# Patient Record
Sex: FEMALE | Race: WHITE
[De-identification: names, ages, dates, MRNs, and addresses within clinical notes are randomized per-mention and may not be internally consistent; named-entity substitution may affect disease eponyms.]

---

## 2019-10-15 ENCOUNTER — HOSPITAL ENCOUNTER (INPATIENT)
Dept: HOSPITAL 96 - M.ERS | Age: 79
LOS: 1 days | Discharge: HOME | DRG: 70 | End: 2019-10-16
Attending: INTERNAL MEDICINE | Admitting: INTERNAL MEDICINE
Payer: MEDICARE

## 2019-10-15 VITALS — SYSTOLIC BLOOD PRESSURE: 122 MMHG | DIASTOLIC BLOOD PRESSURE: 49 MMHG

## 2019-10-15 VITALS — HEIGHT: 60 IN | WEIGHT: 147.4 LBS | BODY MASS INDEX: 28.94 KG/M2

## 2019-10-15 VITALS — DIASTOLIC BLOOD PRESSURE: 45 MMHG | SYSTOLIC BLOOD PRESSURE: 112 MMHG

## 2019-10-15 VITALS — SYSTOLIC BLOOD PRESSURE: 128 MMHG | DIASTOLIC BLOOD PRESSURE: 53 MMHG

## 2019-10-15 VITALS — SYSTOLIC BLOOD PRESSURE: 129 MMHG | DIASTOLIC BLOOD PRESSURE: 56 MMHG

## 2019-10-15 DIAGNOSIS — Z85.42: ICD-10-CM

## 2019-10-15 DIAGNOSIS — Z82.49: ICD-10-CM

## 2019-10-15 DIAGNOSIS — Z92.21: ICD-10-CM

## 2019-10-15 DIAGNOSIS — I10: ICD-10-CM

## 2019-10-15 DIAGNOSIS — Z85.43: ICD-10-CM

## 2019-10-15 DIAGNOSIS — E78.5: ICD-10-CM

## 2019-10-15 DIAGNOSIS — E43: ICD-10-CM

## 2019-10-15 DIAGNOSIS — D64.9: ICD-10-CM

## 2019-10-15 DIAGNOSIS — E86.0: ICD-10-CM

## 2019-10-15 DIAGNOSIS — Z79.899: ICD-10-CM

## 2019-10-15 DIAGNOSIS — G93.41: Primary | ICD-10-CM

## 2019-10-15 DIAGNOSIS — E03.9: ICD-10-CM

## 2019-10-15 DIAGNOSIS — F03.90: ICD-10-CM

## 2019-10-15 DIAGNOSIS — Z91.018: ICD-10-CM

## 2019-10-15 LAB
ALBUMIN SERPL-MCNC: 3.1 G/DL (ref 3.4–5)
ALP SERPL-CCNC: 62 U/L (ref 46–116)
ALT SERPL-CCNC: 20 U/L (ref 30–65)
ANION GAP SERPL CALC-SCNC: 9 MMOL/L (ref 7–16)
APTT BLD: 24.7 SECONDS (ref 25–31.3)
AST SERPL-CCNC: 22 U/L (ref 15–37)
BILIRUB SERPL-MCNC: 0.6 MG/DL
BUN SERPL-MCNC: 16 MG/DL (ref 7–18)
CALCIUM SERPL-MCNC: 9.4 MG/DL (ref 8.5–10.1)
CHLORIDE SERPL-SCNC: 107 MMOL/L (ref 98–107)
CK-MB MASS: 1.6 NG/ML
CO2 SERPL-SCNC: 25 MMOL/L (ref 21–32)
CREAT SERPL-MCNC: 0.7 MG/DL (ref 0.6–1.3)
GLUCOSE SERPL-MCNC: 102 MG/DL (ref 70–99)
GRANULOCYTES NFR BLD MANUAL: 89 %
HCT VFR BLD CALC: 22.8 % (ref 37–47)
HGB BLD-MCNC: 7.9 GM/DL (ref 12–15)
INR PPP: 1.1
LYMPHOCYTES # BLD: 0.3 THOU/UL (ref 0.8–5.3)
LYMPHOCYTES NFR BLD AUTO: 8 %
MCH RBC QN AUTO: 35.8 PG (ref 26–34)
MCHC RBC AUTO-ENTMCNC: 34.7 G/DL (ref 28–37)
MCV RBC: 103.1 FL (ref 80–100)
METAMYELOCYTES NFR BLD: 2 %
MPV: 7.6 FL. (ref 7.2–11.1)
MYELOCYTES NFR BLD: 1 %
NEUTROPHILS # BLD: 3.8 THOU/UL (ref 1.6–8.1)
NT-PRO BRAIN NAT PEPTIDE: 2210 PG/ML (ref ?–300)
NUCLEATED RBCS: 0 /100WBC
OVALOCYTES BLD QL SMEAR: (no result)
PLATELET # BLD EST: ADEQUATE 10*3/UL
PLATELET COUNT*: 163 THOU/UL (ref 150–400)
POTASSIUM SERPL-SCNC: 3.2 MMOL/L (ref 3.5–5.1)
PROT SERPL-MCNC: 5.7 G/DL (ref 6.4–8.2)
PROTHROMBIN TIME: 11.4 SECONDS (ref 9.2–11.5)
RBC # BLD AUTO: 2.21 MIL/UL (ref 4.2–5)
RDW-CV: 20.6 % (ref 10.5–14.5)
SCHISTOCYTES BLD QL SMEAR: (no result)
SODIUM SERPL-SCNC: 141 MMOL/L (ref 136–145)
TEARDROPS: (no result)
WBC # BLD AUTO: 4.1 THOU/UL (ref 4–11)

## 2019-10-15 NOTE — NUR
PT ADMITTED TO TELE FLOOR . ON RA. VSS. ORTHOSTATIC BP TAKEN SEE CHART. NNO
COMPLAINT. R CHEST PORT PATENT. NS INFUSING AT 80 PER HOUR. NEURO AND HEMO/ONC
CONSULTED. DENIES PAIN, N/V. IV FLUID STARTED AT 80 PER HOUR. ADMISSION
ASSESSMENT AND HX DONE. FALL PRECAUTION IN PLACE. CALL LIGHT AT REACH. WILL
CONTINUE TO MONITOR

## 2019-10-15 NOTE — NUR
PT COUSIN CALLED ABOUT DECADRON AND NEUPOGEN THAT NEED TO BE RESTARTED. THIS
WAS DISCUSSED WITH THE HEM/ONC DOCTOR. DOCTOR ORDER TO RESTART DECADRON
STARTING TOMORROW SINCE PT HAS ALREADY RECEIVED A DOSE IN THE ER. DOCTOR
MENTIONED HE WILL FOLLOW UP ABOUT THE NEUPOGEN TOMORROW.

## 2019-10-15 NOTE — EKG
[place] [address]
Phone:  [phone]                     ELECTROCARDIOGRAM REPORT      
_______________________________________________________________________________
 
Name:       YUDELKAJUNE                 Room:           [place] [address]   ADM [place]  
.R.#:  [id number]      Account #:      [id number]  
Admission:  10/15/19     Attend Phys:    Jonathan Gilbert MD 
Discharge:               Date of Birth:  [birth year]  
         Report #: [id number]
    [id number]
_______________________________________________________________________________
THIS REPORT FOR:  //name//                      
 
                         [facility] ED
                                       
Test Date:    2019-10-15               Test Time:    12:59:53
Pat Name:     JUNE JHA             Department:   
Patient ID:   [id number]            Room:         [facility]
Gender:       F                        Technician:   
:          1940               Requested By: Rasheed Arauz
Order Number: [id number] MD:   Stephan Mansfield
                                 Measurements
Intervals                              Axis          
Rate:         64                       P:            
SC:                                    QRS:          -1
QRSD:         113                      T:            41
QT:           411                                    
QTc:          424                                    
                           Interpretive Statements
sinus rhythm
poor r wave progression
Borderline intraventricular conduction delay
Low voltage, precordial leads
Baseline wander in lead(s) III,aVL,aVF
Compared to ECG 2017 12:12:19
rate has increased
Electronically Signed On 10- 16:11:51 CDT by Stephan Mansfield
[email or web address]
 
 
 
 
 
 
 
 
 
 
 
 
 
 
 
 
  <ELECTRONICALLY SIGNED>
                                           By: Stephan Mansfield MD, FAC      
  10/15/19     1611
D: 10/15/19 1259   _____________________________________
T: 10/15/19 1259   Stephan Mansfield MD, [facility]        /EPI

## 2019-10-16 VITALS — DIASTOLIC BLOOD PRESSURE: 53 MMHG | SYSTOLIC BLOOD PRESSURE: 122 MMHG

## 2019-10-16 VITALS — SYSTOLIC BLOOD PRESSURE: 104 MMHG | DIASTOLIC BLOOD PRESSURE: 42 MMHG

## 2019-10-16 VITALS — SYSTOLIC BLOOD PRESSURE: 109 MMHG | DIASTOLIC BLOOD PRESSURE: 47 MMHG

## 2019-10-16 VITALS — SYSTOLIC BLOOD PRESSURE: 106 MMHG | DIASTOLIC BLOOD PRESSURE: 49 MMHG

## 2019-10-16 LAB
ABSOLUTE BASOPHILS: 0 THOU/UL (ref 0–0.2)
ABSOLUTE EOSINOPHILS: 0 THOU/UL (ref 0–0.7)
ABSOLUTE MONOCYTES: 0.1 THOU/UL (ref 0–1.2)
ANION GAP SERPL CALC-SCNC: 11 MMOL/L (ref 7–16)
BASOPHILS NFR BLD AUTO: 0.2 %
BILIRUB UR-MCNC: NEGATIVE MG/DL
BUN SERPL-MCNC: 18 MG/DL (ref 7–18)
CALCIUM SERPL-MCNC: 8.2 MG/DL (ref 8.5–10.1)
CHLORIDE SERPL-SCNC: 103 MMOL/L (ref 98–107)
CO2 SERPL-SCNC: 27 MMOL/L (ref 21–32)
COLOR UR: (no result)
CREAT SERPL-MCNC: 0.6 MG/DL (ref 0.6–1.3)
EOSINOPHIL NFR BLD: 0 %
GLUCOSE SERPL-MCNC: 106 MG/DL (ref 70–99)
GRANULOCYTES NFR BLD MANUAL: 91.8 %
HCT VFR BLD CALC: 24 % (ref 37–47)
HGB BLD-MCNC: 8.3 GM/DL (ref 12–15)
KETONES UR STRIP-MCNC: NEGATIVE MG/DL
LYMPHOCYTES # BLD: 0.3 THOU/UL (ref 0.8–5.3)
LYMPHOCYTES NFR BLD AUTO: 6.5 %
MCH RBC QN AUTO: 36.2 PG (ref 26–34)
MCHC RBC AUTO-ENTMCNC: 34.7 G/DL (ref 28–37)
MCV RBC: 104.4 FL (ref 80–100)
MONOCYTES NFR BLD: 1.5 %
MPV: 7.7 FL. (ref 7.2–11.1)
NEUTROPHILS # BLD: 4.1 THOU/UL (ref 1.6–8.1)
NUCLEATED RBCS: 0 /100WBC
PLATELET COUNT*: 168 THOU/UL (ref 150–400)
POTASSIUM SERPL-SCNC: 2.9 MMOL/L (ref 3.5–5.1)
PROT UR QL STRIP: NEGATIVE
RBC # BLD AUTO: 2.3 MIL/UL (ref 4.2–5)
RBC # UR STRIP: NEGATIVE /UL
RDW-CV: 20.3 % (ref 10.5–14.5)
SODIUM SERPL-SCNC: 141 MMOL/L (ref 136–145)
SP GR UR STRIP: 1.01 (ref 1–1.03)
URINE CLARITY: CLEAR
URINE GLUCOSE-RANDOM: NEGATIVE
URINE LEUKOCYTES-REFLEX: NEGATIVE
URINE NITRITE-REFLEX: NEGATIVE
UROBILINOGEN UR STRIP-ACNC: 0.2 E.U./DL (ref 0.2–1)
WBC # BLD AUTO: 4.5 THOU/UL (ref 4–11)

## 2019-10-16 NOTE — NUR
ASSUMED CARE OF PT AT 0730. PT RESTING IN BED WAITING FOR BREAKFAST. PT A&0X4,
FORGETFUL AT TIMES. TRACING SB ON THE CARDIAC MONITOR. ON RA SAT 99%. PT
DENIES ANY PAIN OR SHORTNESS OF BREATH AT THIS TIME. PT INCONT OF URINE. IVF.
PT UP WITH SBA TO BATHROOM. PT GOAL FOR TODAY IS COMPLETE MRI/MRA, REPLACE
POTASSIUM PER ELECTROLYTE PROTOCOL, OBTAIN URINALYSIS AND REMAIN FREE FROM
SYNCOPAL EPISODES. AM ASSESSMENT AS CHARTED. MEDICATIONS PER MAR. PT
REPOSITIONS SELF WITH REMINDERS. HOURLY ROUNDING OBSERVED. BED IN LOW
POSITION. BED ALARM IN PLACE. FALL PRECAUTIONS IN PLACE. CALL LIGHT WITHIN
REACH. WILL CONTINUE PLAN OF CARE.

## 2019-10-16 NOTE — NUR
ASSUMED CARE OF PT AFTER REPORT AT 1930. PT A&OX4. VSS. PHYSICAL ASSESSMENT
COMPLETED AND CHARTED. PT ON RA. PT TRACING SR/SB ON TELE. PT UPSTANDBY TO
RESTROOM. PT WITH EPISODES OF INCONTINENT BLADDER. PT DENIES ANY PAIN OR
DISCOMFORT. PT ABLE TO SLEEP WELL ON BED. CALL LIGHT WITHIN REACH.

## 2019-10-16 NOTE — NUR
Pt is A&O, some confusion (?). Pt resides at home alone, supportive cousin
that is involved in POC. Pt has a walker and wc that she can use for mobility.
Pt states that she thinks that she will dc to her cousin's home for a while.
DC orders written, pending neuro. Pt declined HH.

## 2019-10-16 NOTE — NUR
PT HAD MRI AND MRA AND EEG TODAY. REFER TO RESULTS. OK FOR DISCHARGE
PER NEURO. DISCHARGE ORDERS RECEIVED. DISCHARGE INSTRUCTIONS, CARE NOTES AND
FOLLOW UP APPTS GIVEN TO PT. PT COMMUNICATES UNDERSTANDING OF DISCHARGE
TEACHING.  CARDIAC MONITOR REMOVED. HERSON CATH DEACCESSED WITH HEPARIN WITH NO
DIFFICULTIES. PT DISCHARGED WITH ALL BELONGINGS AND PAPERWORK VIA WHEELCHAIR
WITH NURSING STAFF TO FAMILY OWN PERSONAL VEHICLE.

## 2019-10-17 NOTE — CON
[address]  [address]                    CONSULTATION                  
_______________________________________________________________________________
 
Name:       JUNE JHA                 Room:           [address] [place]  
M.R.#:  [id number]      Account #:      [id number]  
Admission:  10/15/19     Attend Phys:    Jonathan Gilbert MD 
Discharge:  10/16/19     Date of Birth:  [birth year]  
         Report #: [id number]
                                                                     [id number]  
_______________________________________________________________________________
THIS REPORT FOR:  //name//                      
 
CC: Jonathan Groves [facility]
 
DATE OF SERVICE:  10/15/2019
 
 
PRIMARY GYNECOLOGIC-ONCOLOGIC DOCTOR:  EBONIE Cordero DO
 
SUBJECTIVE:  A 79-year-old  female, who has been receiving palliative
chemotherapy with carbo, Taxol for recurrent uterine cancer.  She received cycle
#4 on 04/14/2019.  The patient was admitted because of a presyncope and fall at
home.  She continues to live independently at the [facility].  She does not
remember exactly any falls or seizure-like activity, no further information or
details has been found in her records.  Otherwise, the patient was initiated on
hydration.  At this point, she denies any major symptoms.  She had some fatigue.
 The patient denies any nausea, vomiting or diarrhea.  However, she reported
mild neuropathy at hands and the feet.
 
REVIEW OF SYSTEMS:  All systems reviewed.  It was negative except the above.
 
PAST MEDICAL HISTORY:  Recurrent uterine cancer, on palliative chemotherapy with
carbo, Taxol, dyslipidemia, and hypothyroidism.
 
MEDICATIONS:  Per admission list.
 
ALLERGIES:  Per records, TOMATO AND HONEY.
 
MEDICATIONS:  Per admission list.
 
FAMILY HISTORY:  Positive for coronary artery disease.
 
SOCIAL HISTORY:  No smoking, no alcohol abuse, no drug abuse.
 
PHYSICAL EXAMINATION:
VITAL SIGNS:  Today, temperature 36.5, pulse 73, respirations is 14, blood
pressure is 124/53, SpO2 was 100% on room air.
GENERAL:  The patient was sitting in chair, the patient was lying in bed.  She
was not in acute distress.
LUNGS:  Clear to auscultations bilaterally.  No crackles or wheezing.
HEART:  Regular rate and rhythm.  S1, S2 within normal limits.
ABDOMEN:  Soft, nontender, nondistended, bowel sounds positive.  No guarding. 
No ascites.
EXTREMITIES:  +1 edema bilaterally.
NEUROLOGICAL:  She is awake and alert.
 
 
 
[place] [address]                    CONSULTATION                  
_______________________________________________________________________________
 
Name:       JUNE JHA [place]                 Room:           [address]    DIS [place]  
M.R.#:  [id number]      Account #:      [id number]  
Admission:  10/15/19     Attend Phys:    Jonathan Gilbert MD 
Discharge:  10/16/19     Date of Birth:  [birth year]  
         Report #: [id number]
                                                                     [id number]  
_______________________________________________________________________________
 
LABORATORY DATA:  Today, WBC of 4.1, hemoglobin 7.9, platelets 163.  Chemistry: 
Sodium is 141, potassium of 3.2, BUN 16, creatinine 0.7.  Magnesium is 1.8.
 
IMAGING:  A chest x-ray showed a right-sided port placement, chronic changes, no
acute process.  Knee x-ray showed degenerative changes.
 
ASSESSMENT AND PLAN:  A 79-year-old  female diagnosed with the
recurrent uterine cancer, currently on palliative chemotherapy with carbo,
Taxol.  The patient was supposed to have a G-CSF 24-hour post her treatment and
she can receive that up to 72 hours.  I discussed with Dr. Cordero over the
phone and the patient will be discharged hopefully in the next 24 hours.  She
still can make it to the window of receiving G-CSF.  Agreed with the current
supportive care.
 
 
 
 
 
 
 
 
 
 
 
 
 
 
 
 
 
 
 
 
 
 
 
 
 
 
 
 
 
 
<ELECTRONICALLY SIGNED>
                                        By:  Suleiman Franz MD          
10/17/19     0836
D: 10/15/19 1921_______________________________________
T: 10/15/19 2027Suleiman Franz MD             /nt

## 2019-10-19 NOTE — EEG
[address]  [address]                    EEG STUDY REPORT              
_______________________________________________________________________________
 
Name:       YUDELKAJUNE                 Room:           [address] [place]  
M.R.#:  [id number]      Account #:      [id number]  
Admission:  10/15/19     Attend Phys:    Jonathan Gilbert MD 
Discharge:  10/16/19     Date of Birth:  [birth year]  
         Report #: [id number]
                                                                     [id number]  
_______________________________________________________________________________
THIS REPORT FOR:  //name//                      
 
CC: Jonathan Gilbert
    [facility]
 
DATE OF SERVICE:  10/16/2019
 
 
The patient had episodes of syncope.  EEG is being done to evaluate the
possibility of seizure.
 
EEG was done by placing the electrode by standard 10-20 system of electrode
placement.  Both referential and sequential montages were used for recording. 
Background activity in this patient's EEG is about 9 Hz and 30 microvolt.  The
patient went to sleep that is associated with bilaterally symmetrical sleep
spindle and vertex sharp waves.  Photic stimulation is unremarkable.  Throughout
the record, no active epileptiform activity was noticed.
 
IMPRESSION:  This patient's EEG does not demonstrate any active epileptiform
activity.  It might be noted that EEG can be normal in a patient with a seizure
disorder.
 
Thank you very much for this referral.
 
 
 
 
 
 
 
 
 
 
 
 
 
 
 
 
 
 
 
 
 
<ELECTRONICALLY SIGNED>
                                        By:  Pal Ta MD         
10/19/19     1925
D: 10/16/19 1707_______________________________________
T: 10/16/19 1759Pal Ta MD            /nt

## 2019-10-19 NOTE — CON
[address]  [address]                    CONSULTATION                  
_______________________________________________________________________________
 
Name:       JUNE JHA                 Room:           [address] [place]  
M.R.#:  [id number]      Account #:      [id number]  
Admission:  10/15/19     Attend Phys:    Jonathan Gilbert MD 
Discharge:  10/16/19     Date of Birth:  [birth year]  
         Report #: [id number]
                                                                     [id number]  
_______________________________________________________________________________
THIS REPORT FOR:  //name//                      
 
CC: Jonathan Groves [facility]
 
DATE OF SERVICE:  10/15/2019
 
 
HISTORY OF PRESENT ILLNESS:  This is a 79-year-old female patient who was seen
by me for a fall.  This patient is a poor historian.  Her memory does not look
very good.  Part of the history is taken from the records and part of the
history is taken from the patient.  This patient has a metastatic ovarian
carcinoma.  She indicated that she fell down and she was found by the neighbor. 
She does not remember anything about that.  She does not know how long she was
on the floor before she was found.  She said she had similar presentation in the
past.  She does not remember when it happened, but the record indicates that she
had some event in 2017.  No tonic-clonic activity was associated with it.  She
does not believe that she had any trauma preceding it.
 
REVIEW OF SYSTEMS:  Indicate that this patient has multiple problems.  She has a
metastatic ovarian carcinoma.  Her memory looks poor.  She does not complain of
any neck pain or neck tenderness.  She is not complaining of any scalp
tenderness.  She is pretty significantly anemic.  It is not clear how long she
has been anemic.  In 2017, it looks like she has a rhabdo and her CPK was very
high that time, but presently her CPK is normal.  A 14-point review of systems
was carried out.  She does not complain of any new eye, ENT, cardiac,
respiratory, GI, , constitutional, dermatological, hematological, psychiatric,
throat, allergic symptom associated with present symptomatology.
 
PAST MEDICAL HISTORY:  Positive for metastatic ovarian carcinoma.
 
FAMILY HISTORY:  Negative for any early age stroke.
 
SOCIAL HISTORY:  The best I can tell, she lives in independent living, but her
memory is very poor.  She denies the use of alcohol.
 
PHYSICAL EXAMINATION:  The patient's examination indicated she can tell me what
month it is.  She could not tell me the exact date.  She could not recall the
hospital.  She could not recall the name of the president.  Her speech looks
intact.  She looks pleasant and fund of knowledge is diminished.  Cranial nerve
examination 2-12 looks mostly unremarkable, but she moves all 4 extremities. 
She does appear weaker than the lower extremities.  She indicates that recently
she has started using a walker and that has made her very comfortable. 
Neuromuscular examination is symmetrical.  Strength is diminished in generalized
fashion.  Position sense is present.  Reflexes are difficult to elicit, but she
is getting chemotherapy.  Tone looks symmetrical.  Finger-to-nose looks
 
 
 
[place] [address]                    CONSULTATION                  
_______________________________________________________________________________
 
Name:       JUNE JHA                 Room:           [address] [place]  
M.R.#:  [id number]      Account #:      [id number]  
Admission:  10/15/19     Attend Phys:    Jonathan Gilbert MD 
Discharge:  10/16/19     Date of Birth:  [birth year]  
         Report #: [id number]
                                                                     [id number]  
_______________________________________________________________________________
unremarkable.  I could not look at the patient's fundus.  Cardiac examinations
appear unremarkable.  No respiratory difficulty or rhonchi was noticed.  She has
nicely palpable pulses in the lower extremities.  She does appear to have some
edema, but she is not aware of it.  Her blood pressure is 123/53, respiration is
14, pulse is 73, temperature is 97.7.
 
LABORATORY DATA:  Indicated that she is markedly anemic.  Her MCV is somewhat
high.  She did have a CT scan of the head and that appeared unremarkable.
 
IMPRESSION:  It is unlikely that the patient passing out or falling down was
because of any neurological etiology, but because of the history I will get an
MRI and an electroencephalogram done tomorrow.  I will suggest concentrating on
looking for a systemic cause for the patient's symptoms.  I will check TSH and
vitamin B12 because of a complaint of cognitive deficits and I will see what
this workup shows and if any other recommendation we may have.
 
Thank you very much for this referral.
 
 
 
 
 
 
 
 
 
 
 
 
 
 
 
 
 
 
 
 
 
 
 
 
 
 
 
<ELECTRONICALLY SIGNED>
                                        By:  Pal Ta MD         
10/19/19     1925
D: 10/15/19 1859_______________________________________
T: 10/15/19 1919Parcarlton Ta MD            /nt